# Patient Record
Sex: FEMALE | Race: WHITE | NOT HISPANIC OR LATINO
[De-identification: names, ages, dates, MRNs, and addresses within clinical notes are randomized per-mention and may not be internally consistent; named-entity substitution may affect disease eponyms.]

---

## 2023-04-13 ENCOUNTER — NON-APPOINTMENT (OUTPATIENT)
Age: 58
End: 2023-04-13

## 2023-05-09 ENCOUNTER — TRANSCRIPTION ENCOUNTER (OUTPATIENT)
Age: 58
End: 2023-05-09

## 2024-04-09 ENCOUNTER — NON-APPOINTMENT (OUTPATIENT)
Age: 59
End: 2024-04-09

## 2024-04-09 DIAGNOSIS — Q66.6 OTHER CONGENITAL VALGUS DEFORMITIES OF FEET: ICD-10-CM

## 2024-04-09 DIAGNOSIS — M72.2 PLANTAR FASCIAL FIBROMATOSIS: ICD-10-CM

## 2024-04-18 ENCOUNTER — APPOINTMENT (OUTPATIENT)
Dept: PODIATRY | Facility: CLINIC | Age: 59
End: 2024-04-18

## 2024-04-18 VITALS — WEIGHT: 293 LBS | BODY MASS INDEX: 44.41 KG/M2 | HEIGHT: 68 IN

## 2024-04-18 PROCEDURE — 20550 NJX 1 TENDON SHEATH/LIGAMENT: CPT | Mod: RT

## 2024-04-18 RX ORDER — LOSARTAN POTASSIUM AND HYDROCHLOROTHIAZIDE 12.5; 1 MG/1; MG/1
100-12.5 TABLET ORAL
Refills: 0 | Status: ACTIVE | COMMUNITY

## 2024-04-21 NOTE — ASSESSMENT
[FreeTextEntry1] : Assessment: Plantar fasciitis, right foot.  Plan: After further discussion it was agreed to give the patient an injection to reduce inflammation and associated pain and to improve function.  The area to be injected was wiped thoroughly with sterile alcohol.  Then using a combination of 1.0 cc of Celestone acetate suspension combined with 1cc of Dexamethasone sodium phosphate and 1.0cc of Lidocaine plain, an injection was given at the plantar medial aspect of the right heel.   Sterile gauze was used for compression and then a sterile Band-Aid was applied to the injection site. The possibility of a flair reaction was discussed with the patient prior to administering the injection.  A compression strapping was applied was applied the foot and ankle for support and to help reduce swelling and pain and improve function.  After further assessment the right foot was placed in approximately 90 degrees to the ankle with the foot in as close to neutral position as possible.  The foot and ankle were prepped to be clean and dry.  Pre tape spray may have been used on the foot and ankle as indicated.  Pre-tape adhesive spray provides an extra sticky, waterproof barrier, that helps tapes and strapping stick better to the skin. Perfect for oily skin, sweaty skin, water sports and extreme environments. It can be a game-changer when strapping challenging areas such as sweaty or wet ankles & feet. I then applied a strapping to the right foot and ankle for support and compression to help relieve pressure and symptoms related to the patient's foot/ankle problem.   The strapping can aid recovery by supporting the muscles, tendons, or ligaments around the affected painful area, as well as improving blood flow to the area. Plus, the strapping will reduce pain and improve the gait, preventing further injury from poor alignment. The strapping also provides proprioceptive feedback. The strapping can reduce the amount of stretching and moving the ligaments do when the patient is weight bearing. This not only gives tissues a better chance to heal, but it also helps prevent further damage. This strapping also minimizes foot pronation, collapse, or flattening which causes over use and irritation to the muscle, tendons, and ligaments in the foot. Often, relief of pain with the strapping is a diagnostic indication for the need of functional orthotic devices. In general, strappings may be used to treat strains, sprains, dislocations, and some fractures. Strapping the foot and ankle provides the external stabilization that stretched ligaments (tissues connecting bone to bone) need while they heal. Most studies show that strapping the foot and ankle decreases the incidence and severity of the affected area.  Besides physical support, strapping can increase biofeedback and increase proprioception, which is a body's ability to know where it is thus aiding in healing and reducing further exacerbation of the pain. The patient was instructed to leave the strapping in place for the next few days to a week, if it was comfortable.  The patient was advised to keep the strapping dry, but leave it on and dry with a hair dryer if it got wet.   The patient was further instructed that if the strapping was uncomfortable or causes any problems it should be removed right away and the office notified.  The patient is to continue with the stretching, call with any problems or questions and return in two weeks.

## 2024-04-21 NOTE — HISTORY OF PRESENT ILLNESS
[FreeTextEntry1] : The patient returns for continued treatment of right heel pain.   She stated that she is feeling better but still has pain in her right heel.  I questioned her if she was doing the stretching, she stated that she was.

## 2024-04-21 NOTE — PHYSICAL EXAM
[TextEntry] : On the plantar aspect of the right heel, temperature, pain and swelling were diminished.   There was improved dorsiflexion, although pain was still palpated on the plantar medial aspect of the right heel.

## 2024-05-02 ENCOUNTER — APPOINTMENT (OUTPATIENT)
Dept: PODIATRY | Facility: CLINIC | Age: 59
End: 2024-05-02
Payer: COMMERCIAL

## 2024-05-02 VITALS — WEIGHT: 293 LBS | BODY MASS INDEX: 44.41 KG/M2 | HEIGHT: 68 IN

## 2024-05-02 PROCEDURE — 20600 DRAIN/INJ JOINT/BURSA W/O US: CPT | Mod: RT

## 2024-05-06 NOTE — PHYSICAL EXAM
[TextEntry] : Greatly diminished swelling was present.  Improved dorsiflexion with less tightness was noted in the plantar fascia of the right foot. Less pain was noted upon palpation of the right heel region.

## 2024-05-06 NOTE — ASSESSMENT
[FreeTextEntry1] : Assessment: Plantar fasciitis, right foot.  Plan: After further discussion it was agreed to give the patient an injection to reduce inflammation and associated pain and to improve function.  The area to be injected was wiped thoroughly with sterile alcohol.  Then using a combination of 1.0 cc of Celestone acetate suspension combined with 1cc of Dexamethasone sodium phosphate and 1.0cc of Lidocaine plain, an injection was given at the medial and most posterior aspect of the right heel.  Sterile gauze was used for compression and then a sterile Band-Aid was applied to the injection site. The possibility of a flair reaction was discussed with the patient prior to administering the injection.  A compression strapping was applied was applied the foot and ankle for support and to help reduce swelling and pain and improve function.  After further assessment the right foot was placed in approximately 90 degrees to the ankle with the foot in as close to neutral position as possible.  The foot and ankle were prepped to be clean and dry.  Pre tape spray may have been used on the foot and ankle as indicated.  Pre-tape adhesive spray provides an extra sticky, waterproof barrier, that helps tapes and strapping stick better to the skin. Perfect for oily skin, sweaty skin, water sports and extreme environments. It can be a game-changer when strapping challenging areas such as sweaty or wet ankles & feet. I then applied a strapping to the right foot and ankle for support and compression to help relieve pressure and symptoms related to the patient's foot/ankle problem.   The strapping can aid recovery by supporting the muscles, tendons, or ligaments around the affected painful area, as well as improving blood flow to the area. Plus, the strapping will reduce pain and improve the gait, preventing further injury from poor alignment. The strapping also provides proprioceptive feedback. The strapping can reduce the amount of stretching and moving the ligaments do when the patient is weight bearing. This not only gives tissues a better chance to heal, but it also helps prevent further damage. This strapping also minimizes foot pronation, collapse, or flattening which causes over use and irritation to the muscle, tendons, and ligaments in the foot. Often, relief of pain with the strapping is a diagnostic indication for the need of functional orthotic devices. In general, strappings may be used to treat strains, sprains, dislocations, and some fractures. Strapping the foot and ankle provides the external stabilization that stretched ligaments (tissues connecting bone to bone) need while they heal. Most studies show that strapping the foot and ankle decreases the incidence and severity of the affected area.  Besides physical support, strapping can increase biofeedback and increase proprioception, which is a body's ability to know where it is thus aiding in healing and reducing further exacerbation of the pain. The patient was instructed to leave the strapping in place for the next few days to a week, if it was comfortable.  The patient was advised to keep the strapping dry, but leave it on and dry with a hair dryer if it got wet.   The patient was further instructed that if the strapping was uncomfortable or causes any problems it should be removed right away and the office notified.  The patient is to continue to stretch. Patient is to call with any problems or questions.  PTR:  1 month.

## 2024-05-06 NOTE — HISTORY OF PRESENT ILLNESS
[FreeTextEntry1] : The patient returns stating that she is doing much better.   Each injection seems to be helping her she said.   She still states that she does get pain now in the bottom of her right heel.

## 2024-06-04 ENCOUNTER — APPOINTMENT (OUTPATIENT)
Dept: PODIATRY | Facility: CLINIC | Age: 59
End: 2024-06-04